# Patient Record
Sex: MALE | Race: BLACK OR AFRICAN AMERICAN
[De-identification: names, ages, dates, MRNs, and addresses within clinical notes are randomized per-mention and may not be internally consistent; named-entity substitution may affect disease eponyms.]

---

## 2020-08-08 ENCOUNTER — HOSPITAL ENCOUNTER (EMERGENCY)
Dept: HOSPITAL 74 - JERFT | Age: 25
Discharge: HOME | End: 2020-08-08
Payer: SELF-PAY

## 2020-08-08 VITALS — DIASTOLIC BLOOD PRESSURE: 82 MMHG | TEMPERATURE: 100.8 F | SYSTOLIC BLOOD PRESSURE: 129 MMHG | HEART RATE: 117 BPM

## 2020-08-08 VITALS — BODY MASS INDEX: 28.9 KG/M2

## 2020-08-08 DIAGNOSIS — R50.9: ICD-10-CM

## 2020-08-08 DIAGNOSIS — Z20.2: Primary | ICD-10-CM

## 2020-08-08 DIAGNOSIS — B27.90: ICD-10-CM

## 2020-08-08 PROCEDURE — U0003 INFECTIOUS AGENT DETECTION BY NUCLEIC ACID (DNA OR RNA); SEVERE ACUTE RESPIRATORY SYNDROME CORONAVIRUS 2 (SARS-COV-2) (CORONAVIRUS DISEASE [COVID-19]), AMPLIFIED PROBE TECHNIQUE, MAKING USE OF HIGH THROUGHPUT TECHNOLOGIES AS DESCRIBED BY CMS-2020-01-R: HCPCS

## 2020-08-08 NOTE — PDOC
History of Present Illness





- General


Chief Complaint: Sore Throat


Stated Complaint: PAIN/CHILLS


Time Seen by Provider: 08/08/20 16:35


History Source: Patient


Exam Limitations: Clinical Condition





- History of Present Illness


Initial Comments: 





08/08/20 17:21


Patient with no significant past medical history present with complaint of 3-day

history of sore throat, fever, chills and intermittent lower back pain.  Patient

has not taken anything for symptoms.  Denies cough, shortness of breath, 

diarrhea, constipation, urinary frequency, dysuria, burning with urination.  

Denies recent travel or sick contacts.  Patient also requests STD testing as he 

was told previous partner had herpes.  Denies any other symptoms.  Patient has 

not taken anything for symptoms


Is this a multiple visit Asthma Patient?: No


Timing/Duration: other (3 days)





Past History





- Medical History


Allergies/Adverse Reactions: 


                                    Allergies











Allergy/AdvReac Type Severity Reaction Status Date / Time


 


No Known Allergies Allergy   Verified 08/08/20 16:32











Home Medications: 


Ambulatory Orders





Amoxicillin/Potassium Clav [Augmentin 875-125 Tablet] 1 each PO BID 7 Days #14 

tablet 08/08/20 


Methylprednisolone [Medrol Dose Germán] 4 mg PO ASDIR #21 tablet 08/08/20 








COPD: No





- Psycho-Social/Smoking History


Smoking History: Never smoked





- Substance Abuse Hx (Audit-C & DAST Scrn)


How often the patient has a drink containing alcohol: Never


Score: In Men: 4 or > Positive; In Women: 3 or > Positive: 0


Screen Result (Pos requires Nsg. Audit-10AR): Negative


In the last yr the pt used illegal drug/Rx for NonMed reason: Yes


Score:  Yes response is considered Positive: 1


Screen Result (Positive result requires Nsg. DAST-10): Positive





**Review of Systems





- Review of Systems


Able to Perform ROS?: Yes


Is the patient limited English proficient: No


Constitutional: Yes: Chills, Fever.  No: Malaise


HEENTM: Yes: Symptoms Reported, See HPI, Throat Pain.  No: Eye Pain, Blurred 

Vision, Tearing, Recent change in vision, Double Vision, Cataracts, Ear Pain, 

Ocular Prothesis, Ear Discharge, Nose Pain, Nose Congestion, Tinnitus, Nose 

Bleeding, Hearing Loss, Throat Swelling, Mouth Pain, Dental Problems, Difficulty

Swallowing, Mouth Swelling, Other


Respiratory: No: Symptoms reported, See HPI, Cough, Orthopnea, Shortness of 

Breath, SOB with Exertion, SOB at Rest, Stridor, Wheezing, Productive cough, 

Hemoptysis, Other


Cardiac (ROS): No: Symptoms Reported, See HPI, Chest Pain, Edema, Irregular 

Heart Rate, Lightheadedness, Palpitations, Syncope, Chest Tightness, Other


ABD/GI: No: Symptoms Reported, Nausea, Vomiting


Integumentary: No: Symptoms Reported, Rash


Neurological: No: Symptoms reported, Dizziness


All Other Systems: Reviewed and Negative





*Physical Exam





- Vital Signs


                                Last Vital Signs











Temp Pulse Resp BP Pulse Ox


 


 100.8 F H  117 H  18   129/82   99 


 


 08/08/20 16:33  08/08/20 16:33  08/08/20 16:33  08/08/20 16:33  08/08/20 16:33














- Physical Exam





08/08/20 17:27


GENERAL:


Well developed, well nourished. Awake and alert. No acute distress.


HEENT: Moderate pharyngeal erythema without exudates.  Oropharynx patent.  

Normocephalic, atraumatic. PERRLA, EOMI. No conjunctival pallor. Sclera are non-

icteric. Moist mucous membranes. 


NECK: 


Supple. Full ROM. 


CARDIOVASCULAR:


Regular rate and rhythm. No murmurs, rubs, or gallops. Distal pulses are 2+ and 

symmetric. 


PULMONARY: 


No evidence of respiratory distress. Lungs clear to auscultation bilaterally. No

wheezing, rales or rhonchi.


ABDOMINAL:


Soft. Non-tender. Non-distended. No rebound or guarding. No organomegaly. 

Normoactive bowel sounds. 


MUSCULOSKELETAL 


Normal range of motion at all joints. no CVAT b/l


EXTREMITIES: 


No cyanosis. No clubbing. No edema. 


SKIN: 


Warm and dry. Normal capillary refill. No rashes. No jaundice. 


NEUROLOGICAL: 


Alert, awake, appropriate.  Gait is normal without ataxia.


PSYCHIATRIC: 


Cooperative. Good eye contact. Appropriate mood 


General Appearance: Yes: Nourished, Appropriately Dressed.  No: Apparent 

Distress





ED Treatment Course





- Medications


Given in the ED: 


ED Medications














Discontinued Medications














Generic Name Dose Route Start Last Admin





  Trade Name Freq  PRN Reason Stop Dose Admin


 


Acetaminophen  650 mg  08/08/20 16:50  08/08/20 17:04





  Tylenol -  PO  08/08/20 16:51  650 mg





  ONCE ONE   Administration














Medical Decision Making





- Medical Decision Making





08/08/20 17:23


Patient with no significant past medical history present with complaint of 3-day

history of sore throat, fever, chills and intermittent lower back pain.  Patient

has not taken anything for symptoms.  Denies cough, shortness of breath, 

diarrhea, constipation, urinary frequency, dysuria, burning with urination.  Den

ies recent travel or sick contacts.  Patient also requests STD testing as he was

told previous partner had herpes.  Denies any other symptoms.  Patient has not 

taken anything for symptoms





Clinical exam unremarkable except for fever of 100.8 F and moderate pharyngeal 

erythema without exudates.  Lungs clear to auscultation bilateral.  Patient in 

no acute distress.  No back pain elicited on exam.  No abdominal tenderness or 

CVA tenderness.





Tylenol 650 mg p.o. ordered for fever.  Rapid strep and COVID test ordered.  

Urine gonorrhea and chlamydia tests ordered as per patient request.  Treat based

on rapid strep result


08/08/20 18:46


Rapid strep negative however given fever with complaint of sore throat and 

pharyngeal erythema, will treat on Augmentin antibiotics pending throat culture 

results.  Patient advised to increase fluid intake and follow-up with PCP and 

will be contacted with COVID and GC lab result.  Patient stable for discharge





Discharge





- Discharge Information


Problems reviewed: Yes


Clinical Impression/Diagnosis: 


 Possible exposure to STD





Pharyngitis


Qualifiers:


 Pharyngitis/tonsillitis etiology: infectious mononucleosis Qualified Code(s): 

B27.90 - Infectious mononucleosis, unspecified without complication





Fever


Qualifiers:


 Fever type: unspecified Qualified Code(s): R50.9 - Fever, unspecified





Condition: Stable


Disposition: HOME





- Admission


No





- Additional Discharge Information


Prescriptions: 


Amoxicillin/Potassium Clav [Augmentin 875-125 Tablet] 1 each PO BID 7 Days #14 

tablet


Methylprednisolone [Medrol Dose Germán] 4 mg PO ASDIR #21 tablet





- Follow up/Referral





- Patient Discharge Instructions


Patient Printed Discharge Instructions:  DI for Pharyngitis/Tonsillopharyngitis 

-- Adult


Additional Instructions: 


Take prescribed medication as prescribed.  Increase fluid intake.  Alternate 

between Tylenol Motrin as needed for fever.  Follow-up with your primary care.  

You will be contacted in a few days with lab results





- Post Discharge Activity